# Patient Record
Sex: MALE | Race: AMERICAN INDIAN OR ALASKA NATIVE
[De-identification: names, ages, dates, MRNs, and addresses within clinical notes are randomized per-mention and may not be internally consistent; named-entity substitution may affect disease eponyms.]

---

## 2017-09-10 ENCOUNTER — HOSPITAL ENCOUNTER (EMERGENCY)
Dept: HOSPITAL 42 - ED | Age: 32
Discharge: HOME | End: 2017-09-10
Payer: SELF-PAY

## 2017-09-10 VITALS — DIASTOLIC BLOOD PRESSURE: 98 MMHG | SYSTOLIC BLOOD PRESSURE: 126 MMHG | HEART RATE: 64 BPM | OXYGEN SATURATION: 100 %

## 2017-09-10 VITALS — BODY MASS INDEX: 23 KG/M2

## 2017-09-10 VITALS — TEMPERATURE: 98.9 F | RESPIRATION RATE: 16 BRPM

## 2017-09-10 DIAGNOSIS — R59.1: Primary | ICD-10-CM

## 2017-09-10 PROCEDURE — 99282 EMERGENCY DEPT VISIT SF MDM: CPT

## 2017-09-10 PROCEDURE — 96372 THER/PROPH/DIAG INJ SC/IM: CPT

## 2017-09-10 NOTE — ED PDOC
Arrival/HPI





- General


Chief Complaint: ENT Problem


Time Seen by Provider: 09/10/17 20:13


Historian: Patient





- History of Present Illness


Narrative History of Present Illness (Text): 





09/10/17 20:36


33 yo M c/o 2 day h/o pain under the R jaw and R ear. Reports taking tylenol 

with no improvement. Denies any fever, chills, sore throat, cough, toothache, 

facial swelling, neck pain, dyspnea, dysphagia, odynophagia, SOB, or headache.


PMD in [place]











Past Medical History





- Provider Review


Nursing Documentation Reviewed: Yes





- Infectious Disease


Hx of Infectious Diseases: None





- Tetanus Immunization


Tetanus Immunization: Unknown





- Past Medical History


Past Medical History: No Previous





- Cardiac


Hx Hypertension: Yes


Other/Comment: CP





- Pulmonary


Hx Respiratory Tract Infection: Yes (Recent)





- Musculoskeletal/Rheumatological


Hx Back Pain: Yes


Hx Herniated Disk: Yes





- Gastrointestinal


Hx Gastrointestinal Ulcer: Yes





- Psychiatric


Hx Anxiety: Yes


Hx Substance Use: No (Denies)





- Past Surgical History


Past Surgical History: No Previous





- Anesthesia


Hx Anesthesia: No


Hx Anesthesia Reactions: No


Hx Malignant Hyperthermia: No





- Suicidal Assessment


Feels Threatened In Home Enviroment: No





Family/Social History





- Physician Review


Nursing Documentation Reviewed: Yes


Family/Social History: No Known Family HX


Smoking Status: Light Smoker < 10 Cigarettes Daily


Hx Alcohol Use: No (Denies)


Hx Substance Use: No (Denies)


Hx Substance Use Treatment: No





Allergies/Home Meds


Allergies/Adverse Reactions: 


Allergies





diphtheria toxoid,adsorbed Allergy (Verified 11/08/16 19:59)


 SWELLING


NSAIDS (Non-Steroidal Anti-Inflamma Allergy (Verified 09/10/17 19:46)


 RASH


tetanus toxoid, adsorbed Allergy (Verified 11/08/16 19:59)


 SWELLING











Review of Systems





- Review of Systems


Constitutional: Normal.  absent: Fatigue, Weight Change, Fevers


ENT: Normal, Other (R ear pain ).  absent: Sore Throat, Rhinorrhea, Epistaxis, 

Sinus Congestion


Respiratory: Normal.  absent: SOB, Cough, Sputum


Musculoskeletal: Normal.  absent: Arthralgias, Back Pain, Neck Pain


Skin: Normal.  absent: Rash, Pruritis, Skin Lesions





Physical Exam





- Physical Exam


Narrative Physical Exam (Text): 





09/10/17 20:40


GENERAL APPEARANCE: Patient is awake, alert, oriented x 3, in mild painful 

distress. 


SKIN:  Warm, dry; (-) cyanosis, (-) rash.  (-) Decubitus Ulcer


EYES:  (-) conjunctival pallor, (-) scleral icterus, (-) conjunctival 

hemorrhage.


ENMT:  Mucous membranes moist.  TMs:  (-) erythema.  Airway patent:  (-) 

stridor.  Pharynx:  (-) erythema, (-) exudate. Dentition : (-) tenderness, (-) 

edema of the gums. (-) Tenderness to the floor of the mouth under the tongue. 


NECK:  (-) tenderness, (-) stiffness, (-) meningismus, (+) tender R sided 

anterior cervical lymphadenopathy under the R mandible, (-) mass, (-) edema.


CHEST AND RESPIRATORY:  (-) accessory muscle use.  Lungs:  (-) rales, (-) 

rhonchi, (-) wheezes, (-) rub; breath sounds equal bilaterally.


HEART AND CARDIOVASCULAR:  (-) irregularity; (-) murmur, (-) gallop, (-) rub.


ABDOMEN AND GI:  Soft; (-) tenderness, (-) guarding; (-) organomegaly; (-) mass

; (-) CVA tenderness. 


EXTREMITIES:  (-) deformity; (-) cellulitis, (-) lymphangitis; (-) subungual 

hemorrhage; (-) edema.


NEURO AND PSYCH:  Mental status as above; (-) focal findings.





Vital Signs











  Temp Pulse Resp BP Pulse Ox


 


 09/10/17 21:08   64  16  126/98 H  100


 


 09/10/17 19:42  98.9 F  76  16  132/90  98














Medical Decision Making


ED Course and Treatment: 





09/10/17 20:41


33 yo M c/o 2 day h/o pain under the R jaw and R ear. Reports taking tylenol 

with no improvement. On exam, patient noted to have tender lymphadenopathy, 

with no other findings. 





DDx: lymphadenopathy, sialadenitis, toothache





Plan : 


- toradol IM


- keflex PO








Patient notified of findings of exam and likely diagnosis of lymphadenopathy. 

Patient states that he can not take NSAIDs due to h/o gastritis. Advised that 

he must follow up with primary care physician or the clinic in 1-2 days without 

fail. Advised to take medication as prescribed. Return to the emergency room at 

any time for any new or worsening symptoms.





Patient states he fully agrees with and understands discharge instructions. 

States that he agrees with the plan and disposition. Verbalized and repeated 

discharge instructions and plan. I have given the patient opportunity to ask 

any additional questions. 








- Medication Orders


Current Medication Orders: 











Discontinued Medications





Amoxicillin (Amoxil 500 Mg Cap)  500 mg PO STAT STA


   PRN Reason: Protocol


   Stop: 09/10/17 20:35


   Last Admin: 09/10/17 20:57  Dose: 500 mg





Ketorolac Tromethamine (Toradol)  60 mg IM STAT STA


   Stop: 09/10/17 20:35


   Last Admin: 09/10/17 20:57  Dose: 60 mg











- PA / NP / Resident Statement


MD/DO has reviewed & agrees with the documentation as recorded.





Disposition/Present on Arrival





- Present on Arrival


Any Indicators Present on Arrival: No


History of DVT/PE: No


History of Uncontrolled Diabetes: No


Urinary Catheter: No


History of Decub. Ulcer: No


History Surgical Site Infection Following: None





- Disposition


Have Diagnosis and Disposition been Completed?: Yes


Diagnosis: 


 Lymphadenopathy





Disposition: HOME/ ROUTINE


Disposition Time: 20:38


Patient Plan: Discharge


Condition: STABLE


Discharge Instructions (ExitCare):  Lymphadenopathy (ED)


Print Language: ENGLISH


Additional Instructions: 


Thank you for letting us take care of you today. You were treated for 

lymphadenopathy. The emergency medical care you received today was directed at 

your acute symptoms. If you were prescribed any medication, please fill it and 

take as directed. It may take several days for your symptoms to resolve. Return 

to the Emergency Department if your symptoms worsen, do not improve, or if you 

have any other problems.





Please contact your doctor in 2 days for re-evaluation and follow up / or call 

one of the physicians/clinics you have been referred to that are listed on the 

Patient Visit Information form that is included in your discharge packet. Bring 

any paperwork you were given at discharge with you along with any medications 

you are taking to your follow up visit. Our treatment cannot replace ongoing 

medical care by a primary care provider (PCP) outside of the emergency 

department.





Thank you for allowing the [email or web address] team to be part of your care today.








Prescriptions: 


Amoxicillin 500 mg PO TID #30 tablet


traMADol [Ultram] 50 mg PO TID #12 tab


Referrals: 


[facility] [place] - Follow up with primary


Forms:  [employer] (English)

## 2018-07-23 ENCOUNTER — HOSPITAL ENCOUNTER (EMERGENCY)
Dept: HOSPITAL 42 - ED | Age: 33
Discharge: HOME | End: 2018-07-23
Payer: SELF-PAY

## 2018-07-23 VITALS
RESPIRATION RATE: 18 BRPM | OXYGEN SATURATION: 99 % | SYSTOLIC BLOOD PRESSURE: 117 MMHG | TEMPERATURE: 98.5 F | DIASTOLIC BLOOD PRESSURE: 75 MMHG | HEART RATE: 72 BPM

## 2018-07-23 VITALS — BODY MASS INDEX: 23.6 KG/M2

## 2018-07-23 DIAGNOSIS — K02.9: Primary | ICD-10-CM

## 2018-07-23 NOTE — ED PDOC
Arrival/HPI





- General


Time Seen by Provider: 07/23/18 21:38


Historian: Patient





- History of Present Illness


Narrative History of Present Illness (Text): 





07/23/18 21:38


Patient was just placed in ED room.


07/23/18 21:40


This 32 yo male with pmh dental caries, drug abuse, presents to this ED c/o 

left upper dental pain x 1 day.  Patient denies facial swelling, dysphagia, sob

, cp, skin rash, HA or dizziness.


Patient tolerates PO fluid and food





Time/Duration: Other (see hpi)


Symptom Course: Worsening


Context: Home





Past Medical History





- Provider Review


Nursing Documentation Reviewed: Yes





- Infectious Disease


Hx of Infectious Diseases: None





- Tetanus Immunization


Tetanus Immunization: Unknown





- Past Medical History


Past Medical History: No Previous





- Cardiac


Hx Hypertension: Yes


Other/Comment: CP





- Pulmonary


Hx Respiratory Tract Infection: Yes (Recent)





- Musculoskeletal/Rheumatological


Hx Back Pain: Yes


Hx Herniated Disk: Yes





- Gastrointestinal


Hx Gastrointestinal Ulcer: Yes





- Psychiatric


Hx Anxiety: Yes


Hx Substance Use: No (Denies)





- Past Surgical History


Past Surgical History: No Previous





- Anesthesia


Hx Anesthesia: No


Hx Anesthesia Reactions: No


Hx Malignant Hyperthermia: No





- Suicidal Assessment


Feels Threatened In Home Enviroment: No





Family/Social History





- Physician Review


Nursing Documentation Reviewed: Yes


Family/Social History: Other (noncontributory)


Smoking Status: Light Smoker < 10 Cigarettes Daily


Hx Alcohol Use: No (Denies)


Hx Substance Use: No (Denies)


Hx Substance Use Treatment: No





Allergies/Home Meds


Allergies/Adverse Reactions: 


Allergies





diphtheria toxoid,adsorbed Allergy (Verified 11/08/16 19:59)


 SWELLING


NSAIDS (Non-Steroidal Anti-Inflamma Allergy (Verified 09/10/17 19:46)


 RASH


tetanus toxoid, adsorbed Allergy (Verified 11/08/16 19:59)


 SWELLING











Review of Systems





- Review of Systems


Constitutional: Normal.  absent: Fatigue, Weight Change, Fevers, Night Sweats


Eyes: Normal.  absent: Vision Changes, Photophobia, Eye Pain


ENT: Other (see hpi).  absent: Sore Throat


Respiratory: Normal.  absent: SOB, Cough


Cardiovascular: Normal.  absent: Chest Pain, Palpitations


Gastrointestinal: Normal.  absent: Abdominal Pain, Nausea, Vomiting


Genitourinary Male: Normal.  absent: Dysuria, Frequency, Hematuria


Musculoskeletal: Normal


Skin: Normal.  absent: Rash


Neurological: Normal.  absent: Headache, Dizziness, Focal Weakness, Gait Changes

, Speech Changes, Facial Droop, Disequilibrium, Seizure


Endocrine: Normal


Hemo/Lymphatic: Normal


Psychiatric: Normal





Physical Exam


Vital Signs











  Temp Pulse Resp BP Pulse Ox


 


 07/23/18 21:42  98.5 F  72  18  117/75  99











Temperature: Afebrile


Blood Pressure: Normal


Pulse: Regular


Respiratory Rate: Normal


Appearance: Positive for: Well-Appearing, Non-Toxic, Comfortable


Pain Distress: None


Mental Status: Positive for: Alert and Oriented X 3





- Systems Exam


Head: Present: Atraumatic, Normocephalic, Other (no facial swelling)


Pupils: Present: PERRL


Extroacular Muscles: Present: EOMI


Conjunctiva: Present: Normal


Mouth: Present: Moist Mucous Membranes, Normal Lips, Normal Tounge, Other (

Dental caries ,  No gum swelling or dental abscess).  No: Drooling, Trismus


Pharnyx: Present: Normal.  No: ERYTHEMA, EXUDATE, TONSILS ENLARGED


Nose (External): Present: Atraumatic


Nose (Internal): Present: Normal Inspection


Neck: Present: Normal Range of Motion, Trachea Midline.  No: Meningeal Signs, 

MIDLINE TENDERNESS, Paraspinal Tenderness


Respiratory/Chest: Present: Clear to Auscultation, Good Air Exchange.  No: 

Respiratory Distress, Accessory Muscle Use, Wheezes, Retracting, Rhonchi


Cardiovascular: Present: Regular Rate and Rhythm, Normal S1, S2.  No: Murmurs


Abdomen: No: Tenderness, Distention, Peritoneal Signs, Rebound, Guarding


Back: Present: Normal Inspection


Upper Extremity: Present: Normal Inspection, Normal ROM.  No: Cyanosis, Edema


Lower Extremity: Present: Normal Inspection, Normal ROM.  No: Edema


Neurological: Present: GCS=15, CN II-XII Intact, Speech Normal, Motor Func 

Grossly Intact, Normal Sensory Function, Normal Cerebellar Funct, Gait Normal, 

Memory Normal


Skin: Present: Warm, Dry, Normal Color.  No: Rashes


Psychiatric: Present: Alert, Oriented x 3, Normal Insight, Normal Concentration





Medical Decision Making


ED Course and Treatment: 





07/23/18 21:49


Re-evaluation.  Patient feels better.  Discussed results and plan with patient 

who expresses understanding.  All questions answered and there is agreement 

with the plan to discharge home with instructions.  Patient stable for 

discharge.  Return if symptoms persist or worsen.


Re-evaluation Time: 21:49


Reassessment Condition: Re-examined, Improved





Disposition/Present on Arrival





- Present on Arrival


Any Indicators Present on Arrival: No


History of DVT/PE: No


History of Uncontrolled Diabetes: No


Urinary Catheter: No


History Surgical Site Infection Following: None





- Disposition


Have Diagnosis and Disposition been Completed?: Yes


Diagnosis: 


 Pain due to dental caries





Disposition: HOME/ ROUTINE


Disposition Time: 21:50


Patient Plan: Discharge


Condition: GOOD


Discharge Instructions (ExitCare):  Tooth Decay, Adult (DC)


Additional Instructions: 


Call private doctor and dentist for follow up visit in 1-2 days.  Take 

medication as instructed.   Return to emergency if symptoms worsen.


Prescriptions: 


Acetaminophen [Tylenol 325mg tab] 650 mg PO Q4H PRN #20 tab


 PRN Reason: Pain, Severe (8-10)


Amoxicillin [Amoxil 500 mg Cap] 500 mg PO TID #30 cap


Chlorhexidine 0.12% [Peridex] 15 ml PO BID #1 bottle


Referrals: 


Supriya Fowler MD [Staff Provider] - Follow up with primary


 Service [Outside] - Follow up with primary


Forms:  WORK NOTE